# Patient Record
Sex: FEMALE | Race: WHITE | Employment: FULL TIME | URBAN - METROPOLITAN AREA
[De-identification: names, ages, dates, MRNs, and addresses within clinical notes are randomized per-mention and may not be internally consistent; named-entity substitution may affect disease eponyms.]

---

## 2017-10-24 ENCOUNTER — OFFICE VISIT (OUTPATIENT)
Dept: OBGYN CLINIC | Facility: CLINIC | Age: 27
End: 2017-10-24

## 2017-10-24 VITALS
HEIGHT: 72 IN | BODY MASS INDEX: 31.37 KG/M2 | SYSTOLIC BLOOD PRESSURE: 122 MMHG | WEIGHT: 231.63 LBS | DIASTOLIC BLOOD PRESSURE: 74 MMHG

## 2017-10-24 DIAGNOSIS — Z20.2 HPV EXPOSURE: ICD-10-CM

## 2017-10-24 DIAGNOSIS — Z01.419 ENCOUNTER FOR CERVICAL PAP SMEAR WITH PELVIC EXAM: Primary | ICD-10-CM

## 2017-10-24 PROCEDURE — 99202 OFFICE O/P NEW SF 15 MIN: CPT | Performed by: ADVANCED PRACTICE MIDWIFE

## 2017-10-24 RX ORDER — ETONOGESTREL/ETHINYL ESTRADIOL .12-.015MG
RING, VAGINAL VAGINAL
Refills: 2 | COMMUNITY
Start: 2017-10-23 | End: 2020-01-07

## 2017-10-24 NOTE — PROGRESS NOTES
S.  Pt states that her partner was recently dx with genital warts. She had a pap smear 4 months ago but is now anxious, would like another pap and an examination of the perineum for warts. Denies any sensation of warts or vaginal d/c.   Is using Nuvaring

## 2019-06-30 ENCOUNTER — HOSPITAL ENCOUNTER (OUTPATIENT)
Age: 29
Discharge: HOME | End: 2019-06-30
Attending: FAMILY MEDICINE
Payer: COMMERCIAL

## 2019-06-30 VITALS
BODY MASS INDEX: 31.15 KG/M2 | HEART RATE: 70 BPM | TEMPERATURE: 98 F | DIASTOLIC BLOOD PRESSURE: 71 MMHG | WEIGHT: 230 LBS | OXYGEN SATURATION: 98 % | HEIGHT: 72 IN | RESPIRATION RATE: 18 BRPM | SYSTOLIC BLOOD PRESSURE: 117 MMHG

## 2019-06-30 DIAGNOSIS — B08.4 HAND, FOOT AND MOUTH DISEASE: Primary | ICD-10-CM

## 2019-06-30 PROCEDURE — 99202 OFFICE O/P NEW SF 15 MIN: CPT

## 2019-06-30 PROCEDURE — 99212 OFFICE O/P EST SF 10 MIN: CPT

## 2019-06-30 NOTE — ED PROVIDER NOTES
Patient Seen in: [facility]    History   Patient presents with:  Rash Skin Problem (integumentary)    Stated Complaint: hand's foot rash    HPI  31yo F presents to [facility] with a rash on the hands and feet noticed last night. No tonsillar exudate. Eyes: Pupils are equal, round, and reactive to light. Conjunctivae and EOM are normal.   Neck: Neck supple. Cardiovascular: Normal rate and regular rhythm.    Pulmonary/Chest: Effort normal and breath sounds normal.   Lymphadenopat

## 2019-06-30 NOTE — ED INITIAL ASSESSMENT (HPI)
C/o burning rash to hands and feet starting at 7pm last night. No fever. Was outside at [facility] yesterday. Sick with cold symptoms for a week. Denies sore throat.

## 2020-12-28 PROBLEM — Z20.2 HPV EXPOSURE: Status: RESOLVED | Noted: 2017-10-24 | Resolved: 2020-12-28

## (undated) NOTE — LETTER
10/28/2017              Kala Bell        [address]  [id number]         Dear Amarilis Middleton,    It was a pleasure to see you. Your PAP test was normal.  There is no need for further testing at this time.   I look forward to seeing you at you